# Patient Record
Sex: MALE | Race: BLACK OR AFRICAN AMERICAN | NOT HISPANIC OR LATINO | URBAN - METROPOLITAN AREA
[De-identification: names, ages, dates, MRNs, and addresses within clinical notes are randomized per-mention and may not be internally consistent; named-entity substitution may affect disease eponyms.]

---

## 2024-04-04 PROBLEM — M79.89 SWELLING OF LOWER EXTREMITY: Status: ACTIVE | Noted: 2024-04-04

## 2024-04-23 ENCOUNTER — OFFICE VISIT (OUTPATIENT)
Age: 59
End: 2024-04-23
Payer: MEDICAID

## 2024-04-23 VITALS
WEIGHT: 190 LBS | DIASTOLIC BLOOD PRESSURE: 80 MMHG | SYSTOLIC BLOOD PRESSURE: 128 MMHG | BODY MASS INDEX: 28.79 KG/M2 | HEIGHT: 68 IN

## 2024-04-23 DIAGNOSIS — I87.303 VENOUS HYPERTENSION OF LOWER EXTREMITY, BILATERAL: Primary | ICD-10-CM

## 2024-04-23 RX ORDER — ASPIRIN 81 MG/1
TABLET ORAL
COMMUNITY

## 2024-04-23 RX ORDER — HYDROCHLOROTHIAZIDE 12.5 MG/1
TABLET ORAL
COMMUNITY

## 2024-04-23 RX ORDER — ACETAMINOPHEN 500 MG
TABLET ORAL
COMMUNITY
Start: 2024-02-12

## 2024-04-23 RX ORDER — FEXOFENADINE HCL 180 MG/1
1 TABLET ORAL DAILY
COMMUNITY
Start: 2024-04-15

## 2024-04-23 RX ORDER — TIZANIDINE 4 MG/1
TABLET ORAL
COMMUNITY
Start: 2024-04-22

## 2024-04-23 RX ORDER — QUETIAPINE FUMARATE 50 MG/1
TABLET, FILM COATED ORAL
COMMUNITY
Start: 2024-04-08

## 2024-04-23 RX ORDER — ATORVASTATIN CALCIUM 80 MG/1
TABLET, FILM COATED ORAL
COMMUNITY
Start: 2023-12-11

## 2024-04-23 RX ORDER — GLIPIZIDE 5 MG/1
TABLET, FILM COATED, EXTENDED RELEASE ORAL
COMMUNITY

## 2024-04-23 RX ORDER — GABAPENTIN 300 MG/1
CAPSULE ORAL
COMMUNITY
Start: 2024-04-22

## 2024-04-23 RX ORDER — OXYCODONE HYDROCHLORIDE AND ACETAMINOPHEN 5; 325 MG/1; MG/1
TABLET ORAL
COMMUNITY
Start: 2024-04-22

## 2024-04-23 RX ORDER — FLUTICASONE PROPIONATE 50 MCG
SPRAY, SUSPENSION (ML) NASAL
COMMUNITY
Start: 2024-04-15

## 2024-04-23 RX ORDER — LOSARTAN POTASSIUM 50 MG/1
1 TABLET ORAL DAILY
COMMUNITY
Start: 2024-04-15

## 2024-04-23 RX ORDER — LOPERAMIDE HYDROCHLORIDE 2 MG/1
CAPSULE ORAL
COMMUNITY
Start: 2024-01-04

## 2024-04-23 RX ORDER — ALBUTEROL SULFATE 90 UG/1
AEROSOL, METERED RESPIRATORY (INHALATION)
COMMUNITY
Start: 2024-03-13

## 2024-04-23 NOTE — PROGRESS NOTES
"Chief Complaint  Follow-up (2 mo follow up for lower extremity swelling and venous insufficiency. )    Subjective      History of Present Illness  Piotr Tinoco presents to [facility]ASC[facility]ERY in a 2 month follow-up for bilateral lower extremity edema.    Past History:  Medical History: has a past medical history of Localized edema (02/20/2024) and Venous insufficiency (chronic) (peripheral).   Surgical History: has a past surgical history that includes Rotator cuff repair.   Family History: family history includes Heart failure in his mother.   Social History: reports that he has never smoked. He does not have any smokeless tobacco history on file. He reports that he does not drink alcohol.    (Not in a hospital admission)     Allergies: Penicillins   Objective   Vital Signs:  /80   Ht 172.7 cm (68\")   Wt 86.2 kg (190 lb)   BMI 28.89 kg/m²   Estimated body mass index is 28.89 kg/m² as calculated from the following:    Height as of this encounter: 172.7 cm (68\").    Weight as of this encounter: 86.2 kg (190 lb).             Physical Exam  Vitals reviewed.   Constitutional:       Appearance: Normal appearance. He is normal weight.   Cardiovascular:      Rate and Rhythm: Normal rate and regular rhythm.   Pulmonary:      Breath sounds: Normal breath sounds.   Musculoskeletal:         General: Normal range of motion.      Right lower leg: Edema present.      Left lower leg: Edema present.   Neurological:      General: No focal deficit present.      Mental Status: He is alert.        Venous:Edema  CEAP Classification:     Result Review :                     Assessment and Plan     Diagnoses and all orders for this visit:    1. Venous hypertension of lower extremity, bilateral (Primary)    Piotr Tinoco is a 59-year-old male who presented on 2/20/202 as a new patient with complaints of edema. This began approximately one month prior when he had to move out of his housing and has since been " living out of his car. The swelling initially began in the left leg and then progressed to the right leg approximately 5 days later. The swelling is worse with prolonged sitting. He does report going to his PCP who wrapped his legs with Ace wraps which reportedly had helped with the swelling. He does have chronic back and hip pain as well as sciatica for which he is being seen by pain management. He has never had any surgeries nor traumas to his legs. No history of DVT or superficial thrombophlebitis. Bilateral lower extremity venous duplex scans done in the office on 2/20/2024 were negative for DVT. We did identify deep venous insufficiency bilaterally as well as chronic changes to his right short saphenous vein.  Turns today and a 2-month follow-up.  He reports he has been faithfully wearing the compression stockings and it has significantly helped with the swelling.  However, he still has some edema and tightness.  He is still living out of his car.  He was notified he will have housing as of next week.  I think this will help his swelling markedly that he will no longer be living out of his car.    We have again discussed the natural history and pathophysiology of venous insufficiency and the importance of wearing compression stockings as well as increased activity level. He was re-measured for graded compression stockings.  I have also given him some samples of Aquaphor healing ointment to apply to his legs to keep them well-lubricated and prevent cracking of the skin.  I will see him back in a 6 month follow-up.  Should he develop any worsening symptoms or any open areas, he is to call our office.           Follow Up     Return in about 6 months (around 10/23/2024) for Maggie Cross.  Patient was given instructions and counseling regarding his condition or for health maintenance advice. Please see specific information pulled into the AVS if appropriate.

## 2024-10-21 ENCOUNTER — OFFICE VISIT (OUTPATIENT)
Age: 59
End: 2024-10-21
Payer: MEDICAID

## 2024-10-21 VITALS
HEIGHT: 68 IN | WEIGHT: 190 LBS | BODY MASS INDEX: 28.79 KG/M2 | SYSTOLIC BLOOD PRESSURE: 122 MMHG | DIASTOLIC BLOOD PRESSURE: 74 MMHG

## 2024-10-21 DIAGNOSIS — I87.303 VENOUS HYPERTENSION OF LOWER EXTREMITY, BILATERAL: Primary | ICD-10-CM

## 2024-10-21 PROCEDURE — 1159F MED LIST DOCD IN RCRD: CPT | Performed by: NURSE PRACTITIONER

## 2024-10-21 PROCEDURE — 1160F RVW MEDS BY RX/DR IN RCRD: CPT | Performed by: NURSE PRACTITIONER

## 2024-10-21 PROCEDURE — 99213 OFFICE O/P EST LOW 20 MIN: CPT | Performed by: NURSE PRACTITIONER

## 2024-10-21 RX ORDER — FLUOXETINE 40 MG/1
40 CAPSULE ORAL DAILY
COMMUNITY
Start: 2024-09-11

## 2024-10-21 RX ORDER — SILDENAFIL 50 MG/1
50 TABLET, FILM COATED ORAL
COMMUNITY

## 2024-10-21 NOTE — PROGRESS NOTES
Chief Complaint  Follow-up (Bilateral lower extremity venous insufficiency. )    Subjective        Piotr Tinoco presents to [facility]  HPI   Piotr Tinoco is a 59 y.o. male that has been followed in our office for bilateral lower extremity edema.  He presented to our office in February for new complaints of edema.   He presented on 2/20/202 as a new patient with complaints of edema. This began approximately one month prior when he had to move out of his housing and has since been living out of his car. The swelling initially began in the left leg and then progressed to the right leg approximately 5 days later. The swelling is worse with prolonged sitting. He does report going to his PCP who wrapped his legs with Ace wraps which reportedly had helped with the swelling. He does have chronic back and hip pain as well as sciatica for which he is being seen by pain management. He has never had any surgeries nor traumas to his legs. No history of DVT or superficial thrombophlebitis. Bilateral lower extremity venous duplex scans done in the office on 2/20/2024 were negative for DVT. We did identify deep venous insufficiency bilaterally as well as chronic changes to his right short saphenous vein.  He returns today in follow up. He has been doing well without hospitalizations or surgeries.  Leg swelling is much better controlled with the use of compression stockings.  He reports he has not worn them in 3 weeks, though he was doing this to see how much swelling he would have prior to this appointment.  Review of Systems   Constitutional:  Negative for fever.   Eyes:  Negative for visual disturbance.   Cardiovascular:  Negative for leg swelling.   Gastrointestinal:  Negative for abdominal pain.   Musculoskeletal:  Negative for back pain.   Skin:  Negative for color change, pallor and wound.   Neurological:  Negative for dizziness, facial asymmetry, speech difficulty and weakness.         Piotr Tinoco  reports that he has never smoked. He does not have any smokeless tobacco history on file..        Objective   Vital Signs:  Vitals:    10/21/24 1025   BP: 122/74      Body mass index is 28.9 kg/m².           Physical Exam  Vitals reviewed.   Constitutional:       Appearance: Normal appearance.   HENT:      Head: Normocephalic.   Cardiovascular:      Rate and Rhythm: Normal rate and regular rhythm.      Pulses: Normal pulses.           Dorsalis pedis pulses are 3+ on the right side and 3+ on the left side.        Posterior tibial pulses are 3+ on the right side and 3+ on the left side.   Pulmonary:      Effort: Pulmonary effort is normal.   Skin:     General: Skin is warm.   Neurological:      General: No focal deficit present.      Mental Status: He is alert and oriented to person, place, and time.   Psychiatric:         Mood and Affect: Mood normal.          Result Review :                         Assessment and Plan     Diagnoses and all orders for this visit:    1. Venous hypertension of lower extremity, bilateral (Primary)      Patient presents for follow-up of his bilateral deep vein insufficiency.  This is much improved with the use of compression stockings.  We discussed that compression is the mainstay of therapy for this and he should wear these much as he can.  We have given a new pair today.  He will return as needed.           Follow Up     No follow-ups on file.  Patient was given instructions and counseling regarding his condition or for health maintenance advice. Please see specific information pulled into the AVS if appropriate.     JIN Ann